# Patient Record
Sex: FEMALE | Race: OTHER | Employment: UNEMPLOYED | URBAN - METROPOLITAN AREA
[De-identification: names, ages, dates, MRNs, and addresses within clinical notes are randomized per-mention and may not be internally consistent; named-entity substitution may affect disease eponyms.]

---

## 2024-06-14 ENCOUNTER — HOSPITAL ENCOUNTER (EMERGENCY)
Facility: HOSPITAL | Age: 24
Discharge: HOME | End: 2024-06-14
Attending: EMERGENCY MEDICINE

## 2024-06-14 VITALS
BODY MASS INDEX: 27.32 KG/M2 | RESPIRATION RATE: 18 BRPM | HEIGHT: 66 IN | OXYGEN SATURATION: 97 % | DIASTOLIC BLOOD PRESSURE: 80 MMHG | SYSTOLIC BLOOD PRESSURE: 128 MMHG | TEMPERATURE: 100 F | WEIGHT: 170 LBS | HEART RATE: 84 BPM

## 2024-06-14 DIAGNOSIS — H10.32 ACUTE BACTERIAL CONJUNCTIVITIS OF LEFT EYE: Primary | ICD-10-CM

## 2024-06-14 PROCEDURE — 99283 EMERGENCY DEPT VISIT LOW MDM: CPT

## 2024-06-14 RX ORDER — LEVOFLOXACIN 5 MG/ML
SOLUTION/ DROPS TOPICAL
Qty: 5 ML | Refills: 0 | Status: SHIPPED | OUTPATIENT
Start: 2024-06-14 | End: 2024-06-21

## 2024-06-14 NOTE — ED PROVIDER NOTES
Patient Seen in: [facility] Emergency Department      History   No chief complaint on file.    Stated Complaint: Eye Problem    Subjective:   HPI        Objective:   History reviewed. No pertinent past medical history.           Past Surgical History:   Procedure Laterality Date    Appendectomy                  Social History     Socioeconomic History    Marital status: Single   Tobacco Use    Smoking status: Never    Smokeless tobacco: Never   Vaping Use    Vaping status: Never Used   Substance and Sexual Activity    Alcohol use: Never    Drug use: Never     Social Determinants of Health     Financial Resource Strain: Not on File (10/5/2022)    Received from [employer]     Financial Resource Strain     Financial Resource Strain: 0   Food Insecurity: Not at Risk (2024)    Received from [employer]     Food Insecurity     Food: 1   Transportation Needs: Not at Risk (2024)    Received from [employer]     Transportation Needs     Transportation: 1   Physical Activity: Not on File (10/5/2022)    Received from [employer]     Physical Activity     Physical Activity: 0   Stress: Not on File (10/5/2022)    Received from [employer]     Stress     Stress: 0   Social Connections: Not on File (10/5/2022)    Received from [employer]     Social Connections     Social Connections and Isolation: 0   Housing Stability: Not at Risk (2024)    Received from [employer]     Housing Stability     Housin              Review of Systems    Positive for stated complaint: Eye Problem  Other systems are as noted in HPI.  Constitutional and vital signs reviewed.      All other systems reviewed and negative except as noted above.    Physical Exam     ED Triage Vitals [24 1212]   /80   Pulse 84   Resp 18   Temp 99.5 °F (37.5 °C)   Temp src Oral   SpO2 97 %   O2 Device None (Room air)       Current Vitals:   Vital Signs  BP: 128/80  Pulse: 84  Resp: 18  Temp: 99.5 °F (37.5 °C)  Temp src: Oral    Oxygen Therapy  SpO2: 97 %  O2 Device: None (Room  air)            Physical Exam          ED Course   Labs Reviewed - No data to display                   MDM      24-year-old female without significant past medical history presents with left eye discomfort.  Patient states starting yesterday morning, she has had some redness and discomfort of the left eye.  She had some greenish discharge yesterday, and today her left eye was sealed shut this morning upon waking.  The pain is mild and she has some mild blurry vision as well.  Denies pain with extraocular motion, injury to the eye, fevers, or other systemic symptoms.    On exam, vitals normal, well-appearing, PERRLA, EOMI, conjunctival and scleral injection of the left eye that does involve the limbus.  No proptosis.  Clear left-sided ocular discharge.  No erythema or induration surrounding the eye.    Differential: Viral versus bacterial conjunctivitis.  Considered but low likelihood endophthalmitis, orbital cellulitis, uveitis, or other emergent pathology.    Patient advised to stop wearing contact lenses and started on a fluoroquinolone eyedrop with instructions on ophthalmology follow-up with return precautions.                                   MDM    Disposition and Plan     Clinical Impression:  1. Acute bacterial conjunctivitis of left eye         Disposition:  Discharge  6/14/2024 12:36 pm    Follow-up:  Marques Shirley MD  [address] [address]  [address]  [facility] [address]  [phone]    Follow up in 3 day(s)  As needed          Medications Prescribed:  Discharge Medication List as of 6/14/2024 12:49 PM        START taking these medications    Details   levoFLOXacin 0.5 % Ophthalmic Solution Place 2 drops into the left eye every 2 (two) hours for 2 days, THEN 2 drops every 6 (six) hours for 5 days., Normal, Disp-5 mL, R-0

## 2024-06-14 NOTE — ED INITIAL ASSESSMENT (HPI)
Pt here for c/o left eye pain. Yesterday her eye was producing green discharge. This morning when she woke up she was unable to open her eye due to crusted discharge. Eyes appears slightly swollen and red and is tearing. Denies trauma to eye.

## (undated) NOTE — LETTER
Date & Time: 6/14/2024, 12:52 PM  Patient: Katherin Garcia  Encounter Provider(s):    Wes Bee MD       To Whom It May Concern:    Katherin Garcia was seen and treated in our department on 6/14/2024. She can return to work 06/17/24.    If you have any questions or concerns, please do not hesitate to call.        _____________________________  Physician/[facility] Signature